# Patient Record
Sex: FEMALE
[De-identification: names, ages, dates, MRNs, and addresses within clinical notes are randomized per-mention and may not be internally consistent; named-entity substitution may affect disease eponyms.]

---

## 2024-08-19 ENCOUNTER — APPOINTMENT (OUTPATIENT)
Dept: UROGYNECOLOGY | Facility: CLINIC | Age: 39
End: 2024-08-19
Payer: MEDICAID

## 2024-08-19 VITALS
BODY MASS INDEX: 23.16 KG/M2 | HEART RATE: 69 BPM | WEIGHT: 118 LBS | DIASTOLIC BLOOD PRESSURE: 63 MMHG | HEIGHT: 60 IN | SYSTOLIC BLOOD PRESSURE: 97 MMHG

## 2024-08-19 DIAGNOSIS — Z82.49 FAMILY HISTORY OF ISCHEMIC HEART DISEASE AND OTHER DISEASES OF THE CIRCULATORY SYSTEM: ICD-10-CM

## 2024-08-19 DIAGNOSIS — R39.15 URGENCY OF URINATION: ICD-10-CM

## 2024-08-19 DIAGNOSIS — Z83.3 FAMILY HISTORY OF DIABETES MELLITUS: ICD-10-CM

## 2024-08-19 DIAGNOSIS — R35.1 NOCTURIA: ICD-10-CM

## 2024-08-19 DIAGNOSIS — D64.9 ANEMIA, UNSPECIFIED: ICD-10-CM

## 2024-08-19 DIAGNOSIS — K59.09 OTHER CONSTIPATION: ICD-10-CM

## 2024-08-19 DIAGNOSIS — Z78.9 OTHER SPECIFIED HEALTH STATUS: ICD-10-CM

## 2024-08-19 DIAGNOSIS — N39.3 STRESS INCONTINENCE (FEMALE) (MALE): ICD-10-CM

## 2024-08-19 DIAGNOSIS — Z82.5 FAMILY HISTORY OF ASTHMA AND OTHER CHRONIC LOWER RESPIRATORY DISEASES: ICD-10-CM

## 2024-08-19 PROBLEM — Z00.00 ENCOUNTER FOR PREVENTIVE HEALTH EXAMINATION: Status: ACTIVE | Noted: 2024-08-19

## 2024-08-19 PROCEDURE — 51701 INSERT BLADDER CATHETER: CPT

## 2024-08-19 PROCEDURE — 99205 OFFICE O/P NEW HI 60 MIN: CPT | Mod: 25

## 2024-08-19 PROCEDURE — 99459 PELVIC EXAMINATION: CPT

## 2024-08-19 RX ORDER — FERROUS SULFATE 325(65) MG
TABLET ORAL
Refills: 0 | Status: ACTIVE | COMMUNITY

## 2024-08-20 PROBLEM — R35.1 NOCTURIA: Status: ACTIVE | Noted: 2024-08-20

## 2024-08-20 PROBLEM — K59.09 CHRONIC CONSTIPATION: Status: ACTIVE | Noted: 2024-08-20

## 2024-08-20 NOTE — REASON FOR VISIT
[TextEntry] : Reason for visit:  New Pt  Voids per day:   6 Voids per night:   2 Urge incontinence Yes to Urgency  Stress incontinence: No Constipation: Occasionally  Fecal incontinence: No Vaginal bulge: No

## 2024-08-20 NOTE — COUNSELING
[FreeTextEntry1] : Please follow up with the physician assistant in about 3 months.  Please call with any sxs of a UTI. You can use D-mannose (2 grams) and cranberry extract daily to help prevent UTIs.  Please start pelvic floor physical therapy sessions. Below are some locations for pelvic PT.  [facility] Physical Therapy [address] [place] [address] Physical Therapy [address] [place] [address] Phone: [phone]   At home pelvic floor physical therapy: [email or web address]  For Urgency, Frequency and Urge related incontinence.  Please decrease or stop the use of the followin. Coffee (Caffeinated and decaffeinated)  2. Teas (Caffeinated, decaffeinated, Ice tea, and green teas  3. All sodas (Caffeinated, decaffeinated, energy drinks)  4. All carbonated drinks including seltzer water  5. All citric fruit juices  6. Water with lemon or lime  7. Spicy foods  8. Tomato Sauce based foods  9. Chocolate and chocolate containing products  10. All alcohol

## 2024-08-20 NOTE — HISTORY OF PRESENT ILLNESS
[FreeTextEntry1] : 39 year para 3 (c/s x1,  x2) presents with complaints of recurrent UTIs for the past year. She states she had 6 this year. Typical sxs: dysuria, frequency, hematuria Currently no sxs.  UCx: 2024 - >100K E coli  Pelvic organ prolapse: no bulge, no pressure/heaviness  Stress urinary incontinence: 1 x/week no prior incontinence procedures  Overactive bladder syndrome: daily frequency 6 x/day, 2+ x/night,  occasional urgency,  2x x/week UUI episodes, 1 pads/day     Bladder irritants include coffee (2 cups), soda,    Prior OAB meds no  Voiding dysfunction: no Incomplete bladder emptying, no hesitancy  Lower urinary tract/vaginal symptoms: 6 UTIs per year, + hematuria, + dysuria, no bladder pain  2-3 BM/week   + constipation (OTC laxatives)   Fecal incontinence no  Sexually active + (5x/week)  Dyspareunia no   Pelvic pain no   Vaginal dryness no   LMP regular menses

## 2024-08-20 NOTE — PHYSICAL EXAM
[Chaperone Present] : A chaperone was present in the examining room during all aspects of the physical examination [[id number]] : A chaperone was present during the pelvic exam. [FreeTextEntry2] : Jasmina [FreeTextEntry1] : Void: 80 cc  PVR:10  cc  Urethra was prepped in sterile fashion and then a sterile 14F catheter was used by me to drain the bladder for her symptoms of Urgency. Patient tolerated the procedure well  empty cough stress test -  atrophy -  urethral caruncle -  vestibular tenderness -  prolapse -  urethral hypermobility +  pelvic floor dysfunction -  urethral tenderness -  bladder tenderness -  cervix Normal   uterus 6 weeks  adnexa nonpalpable -  intact sacral nerves  1/5 Jo Ann

## 2024-08-20 NOTE — ASSESSMENT
[FreeTextEntry1] : Hx of UTI - Discussed criteria for making diagnosis of Janie. Patient does not meet diagnosis. Advised to call with sxs of UTI. Discussed preventative measures for UTIs. Provided with reassurance.  OLIVE - Discussed etiology of the condition with the patient. Discussed treatment options, including observation, anti-incontinence devices, pelvic floor physical therapy, and surgical options. She would like to try pelvic floor PT.  Urinary urgency, nocturia, UUI - Discussed etiology of the condition with the patient. Discussed management options, including first line management options consisting of diet and lifestyle modifications, second line options consisting of medications, and third line options. Reviewed PTNS, bladder botox, and Interstim. Discussed R/B/A of anticholinergics versus b-3 agonists. Patient will start with bladder diet and bladder training. We spoke about bladder irritants at length, and she was given a list of bladder irritants to avoid.  Constipation - If continues to have constipation will discuss fiber supplementation and miralax at next visit.  Will f/u in 3 months to reevaluate her URVASHI.

## 2024-08-20 NOTE — COUNSELING
[FreeTextEntry1] : Please follow up with the physician assistant in about 3 months.  Please call with any sxs of a UTI. You can use D-mannose (2 grams) and cranberry extract daily to help prevent UTIs.  Please start pelvic floor physical therapy sessions. Below are some locations for pelvic PT.  [facility] Physical Therapy [address] [place] [address] Physical Therapy [address] [place] [address] Phone: [phone]   At home pelvic floor physical therapy: [email or web address]  For Urgency, Frequency and Urge related incontinence.  Please decrease or stop the use of the followin. Coffee (Caffeinated and decaffeinated)  2. Teas (Caffeinated, decaffeinated, Ice tea, and green teas  3. All sodas (Caffeinated, decaffeinated, energy drinks)  4. All carbonated drinks including seltzer water  5. All citric fruit juices  6. Water with lemon or lime  7. Spicy foods  8. Tomato Sauce based foods  9. Chocolate and chocolate containing products  10. All alcohol

## 2024-08-20 NOTE — PHYSICAL EXAM
[Chaperone Present] : A chaperone was present in the examining room during all aspects of the physical examination [[id number]] : A chaperone was present during the pelvic exam. [FreeTextEntry2] : Jasmina [FreeTextEntry1] : Void: 80 cc  PVR:10  cc  Urethra was prepped in sterile fashion and then a sterile 14F catheter was used by me to drain the bladder for her symptoms of Urgency. Patient tolerated the procedure well  empty cough stress test -  atrophy -  urethral caruncle -  vestibular tenderness -  prolapse -  urethral hypermobility +  pelvic floor dysfunction -  urethral tenderness -  bladder tenderness -  cervix Normal   uterus 6 weeks  adnexa nonpalpable -  intact sacral nerves  1/5 Jo Ann

## 2024-08-21 LAB
APPEARANCE: CLEAR
BILIRUBIN URINE: NEGATIVE
BLOOD URINE: NEGATIVE
COLOR: YELLOW
GLUCOSE QUALITATIVE U: NEGATIVE MG/DL
KETONES URINE: NEGATIVE MG/DL
LEUKOCYTE ESTERASE URINE: NEGATIVE
NITRITE URINE: NEGATIVE
PH URINE: 6.5
PROTEIN URINE: NORMAL MG/DL
SPECIFIC GRAVITY URINE: 1.02
UROBILINOGEN URINE: 0.2 MG/DL

## 2024-08-22 LAB — URINE CULTURE <10: NORMAL

## 2024-11-19 ENCOUNTER — APPOINTMENT (OUTPATIENT)
Dept: UROGYNECOLOGY | Facility: CLINIC | Age: 39
End: 2024-11-19